# Patient Record
Sex: MALE | Race: BLACK OR AFRICAN AMERICAN | NOT HISPANIC OR LATINO | Employment: FULL TIME | URBAN - METROPOLITAN AREA
[De-identification: names, ages, dates, MRNs, and addresses within clinical notes are randomized per-mention and may not be internally consistent; named-entity substitution may affect disease eponyms.]

---

## 2021-12-02 ENCOUNTER — OCCUPATIONAL HEALTH (OUTPATIENT)
Dept: URGENT CARE | Facility: CLINIC | Age: 36
End: 2021-12-02

## 2021-12-02 DIAGNOSIS — Z02.83 ENCOUNTER FOR DRUG SCREENING: Primary | ICD-10-CM

## 2021-12-02 LAB
CTP QC/QA: YES
POC 10 PANEL DRUG SCREEN: NEGATIVE

## 2021-12-02 PROCEDURE — 80305 DRUG TEST PRSMV DIR OPT OBS: CPT | Mod: S$GLB,,, | Performed by: FAMILY MEDICINE

## 2021-12-02 PROCEDURE — 80305 POCT RAPID DRUG SCREEN 10 PANEL: ICD-10-PCS | Mod: S$GLB,,, | Performed by: FAMILY MEDICINE

## 2022-06-28 ENCOUNTER — OCCUPATIONAL HEALTH (OUTPATIENT)
Dept: URGENT CARE | Facility: CLINIC | Age: 37
End: 2022-06-28

## 2022-06-28 DIAGNOSIS — Z02.83 ENCOUNTER FOR EMPLOYMENT-RELATED DRUG TESTING: Primary | ICD-10-CM

## 2022-06-28 PROCEDURE — 80305 DRUG TEST PRSMV DIR OPT OBS: CPT | Mod: S$GLB,,, | Performed by: FAMILY MEDICINE

## 2022-06-28 PROCEDURE — 80305 OOH NON-DOT DRUG SCREEN: ICD-10-PCS | Mod: S$GLB,,, | Performed by: FAMILY MEDICINE

## 2024-04-23 ENCOUNTER — TELEPHONE (OUTPATIENT)
Dept: UROLOGY | Facility: CLINIC | Age: 39
End: 2024-04-23
Payer: COMMERCIAL

## 2024-04-23 NOTE — TELEPHONE ENCOUNTER
----- Message from Sandy Verdugo sent at 4/22/2024  6:00 PM CDT -----  Type:  Appointment Request         Name of Caller:pt  When is the first available appointment?No access  Symptoms:kidney stones  Would the patient rather a call back or a response via [facility]? call  Best Call Back Number:[phone]   Additional Information: Pt needs hospital f/u appt, and preferred Dr. York. Please call pt with further info and assistance. Thank you.

## 2024-04-30 ENCOUNTER — OFFICE VISIT (OUTPATIENT)
Dept: UROLOGY | Facility: CLINIC | Age: 39
End: 2024-04-30
Payer: COMMERCIAL

## 2024-04-30 VITALS — WEIGHT: 315 LBS | HEIGHT: 75 IN | BODY MASS INDEX: 39.17 KG/M2

## 2024-04-30 DIAGNOSIS — N20.0 KIDNEY STONES: Primary | ICD-10-CM

## 2024-04-30 LAB
BILIRUBIN, UA POC OHS: ABNORMAL
BLOOD, UA POC OHS: NEGATIVE
CLARITY, UA POC OHS: CLEAR
COLOR, UA POC OHS: YELLOW
GLUCOSE, UA POC OHS: NEGATIVE
KETONES, UA POC OHS: ABNORMAL
LEUKOCYTES, UA POC OHS: NEGATIVE
NITRITE, UA POC OHS: NEGATIVE
PH, UA POC OHS: 5.5
PROTEIN, UA POC OHS: NEGATIVE
SPECIFIC GRAVITY, UA POC OHS: 1.02
UROBILINOGEN, UA POC OHS: 0.2

## 2024-04-30 PROCEDURE — 82365 CALCULUS SPECTROSCOPY: CPT

## 2024-04-30 PROCEDURE — 99999 PR PBB SHADOW E&M-EST. PATIENT-LVL III: CPT | Mod: PBBFAC,,,

## 2024-04-30 PROCEDURE — 99203 OFFICE O/P NEW LOW 30 MIN: CPT | Mod: S$GLB,,,

## 2024-04-30 PROCEDURE — 81003 URINALYSIS AUTO W/O SCOPE: CPT | Mod: QW,S$GLB,,

## 2024-04-30 PROCEDURE — 3008F BODY MASS INDEX DOCD: CPT | Mod: CPTII,S$GLB,,

## 2024-04-30 PROCEDURE — 1160F RVW MEDS BY RX/DR IN RCRD: CPT | Mod: CPTII,S$GLB,,

## 2024-04-30 PROCEDURE — 1159F MED LIST DOCD IN RCRD: CPT | Mod: CPTII,S$GLB,,

## 2024-04-30 NOTE — PROGRESS NOTES
"[facility] Note  Staff: BRANNON Patton    PCP: None    Chief Complaint: Kidney Stone    Subjective:        HPI: Rafa Fleming is a 38 y.o. male NEW PATIENT presents today for ED follow up kidney stone. He presented to the ED on 4/18/2024 with right sided abd pain. CT ABD/Pelvis with IV contrast showed There is a 2 mm punctate calculus in the right posterior bladder right UVJ there is mild right hydroureteronephrosis. There is mild right-sided perinephric stranding. There is delayed excretion of contrast into the collecting systems on the right. There is normal excretion of contrast into the left renal collecting system and ureter.     I reviewed all imaging with him today. He states he passed the stone the same day and he brought it with him today for analysis. He does have a history of kidney stones. He denies dysuria, hematuria, fever, flank pain, incontinence, and difficulty urinating.     Questions asked the pt during ov today:  Dysuria: No  Gross Hematuria:No  Straining:No, Hesistancy:No, Intermittency:No}, Weak stream:No    Last PSA Screening: No results found for: "PSA", "PSADIAG"    History of Kidney Stones?:  Yes    Constipation issues?:  No    REVIEW OF SYSTEMS:  Review of Systems   Constitutional: Negative.  Negative for chills and fever.   HENT: Negative.     Eyes: Negative.    Respiratory: Negative.     Cardiovascular: Negative.    Gastrointestinal: Negative.  Negative for abdominal pain, constipation, diarrhea, nausea and vomiting.   Genitourinary: Negative.  Negative for dysuria, flank pain, frequency, hematuria and urgency.   Musculoskeletal: Negative.  Negative for back pain.   Skin: Negative.    Neurological: Negative.    Endo/Heme/Allergies: Negative.    Psychiatric/Behavioral: Negative.         PMHx:  Past Medical History:   Diagnosis Date    Asthma        PSHx:  Past Surgical History:   Procedure Laterality Date    BREAST SURGERY         Fam Hx:   malignancies: " No    kidney stones: Yes - father     Soc Hx:  Lives in [place]    Allergies:  Patient has no known allergies.    Medications: reviewed     Objective:   There were no vitals filed for this visit.    Physical Exam  Constitutional:       Appearance: Normal appearance.   HENT:      Head: Normocephalic.      Mouth/Throat:      Mouth: Mucous membranes are moist.   Eyes:      Conjunctiva/sclera: Conjunctivae normal.   Pulmonary:      Effort: Pulmonary effort is normal.   Abdominal:      General: There is no distension.      Palpations: Abdomen is soft.      Tenderness: There is no abdominal tenderness. There is no right CVA tenderness or left CVA tenderness.   Musculoskeletal:         General: Normal range of motion.      Cervical back: Normal range of motion.   Skin:     General: Skin is warm.   Neurological:      Mental Status: He is alert and oriented to person, place, and time.   Psychiatric:         Mood and Affect: Mood normal.         Behavior: Behavior normal.         LABS REVIEW:  UA today:  Color:Clear, Yellow  Spec. Grav.  1.025  PH  5.5  Negative for leukocytes, nitrates, protein, glucose, urobili, and blood.  Small bili  Trace ketones    Assessment:       1. Kidney stones          Plan:     Stone sent for analysis  Things you can do to decrease your risk of recurring stones:  1. Increase fluid intake - You should be producing at least 2.5 L of urine per 24 hour period. If your urine is dark you need to be drinking more water.  2. Lower sodium intake - this helps lower the amount of calcium being lost in your urine. An ideal intake is between 2300 and 3300mg of sodium daily.  3. Normal calcium diet - ideal intake is between 800 and 1200mg of calcium daily. You do not want to decrease the amount of calcium you take in because this can actually increase your risk of making stone.     Limit iced tea and billy,  avoid Tums and Rolaids, to avoid Vitamin C supplementation and to limit salt and red meat intake.       F/u as needed    [facility]: Pending    HECTOR Patton-C

## 2024-05-03 LAB
COMPN STONE: NORMAL
LABORATORY COMMENT REPORT: NORMAL
SPECIMEN SOURCE: NORMAL
STONE ANALYSIS IR-IMP: NORMAL

## 2024-05-06 ENCOUNTER — TELEPHONE (OUTPATIENT)
Dept: UROLOGY | Facility: CLINIC | Age: 39
End: 2024-05-06
Payer: COMMERCIAL

## 2024-05-06 NOTE — TELEPHONE ENCOUNTER
----- Message from Carmina Hernandez NP sent at 5/6/2024 10:49 AM CDT -----  Stone came back calcium oxalate:    Increase water intake and add lemon juice  Reduce food/drink high in calcium  Avoid calcium supplementation, including TUMS/rolaids  Reduce food/drink high in oxalate, such as tea  Avoid soft drinks  Reduce salt in diet

## 2024-10-25 ENCOUNTER — PATIENT MESSAGE (OUTPATIENT)
Dept: RESEARCH | Facility: HOSPITAL | Age: 39
End: 2024-10-25

## 2025-02-13 ENCOUNTER — OFFICE VISIT (OUTPATIENT)
Dept: URGENT CARE | Facility: CLINIC | Age: 40
End: 2025-02-13

## 2025-02-13 VITALS
SYSTOLIC BLOOD PRESSURE: 147 MMHG | RESPIRATION RATE: 16 BRPM | DIASTOLIC BLOOD PRESSURE: 95 MMHG | OXYGEN SATURATION: 98 % | HEIGHT: 75 IN | TEMPERATURE: 99 F | WEIGHT: 270 LBS | HEART RATE: 70 BPM | BODY MASS INDEX: 33.57 KG/M2

## 2025-02-13 DIAGNOSIS — S62.665A CLOSED NONDISPLACED FRACTURE OF DISTAL PHALANX OF LEFT RING FINGER, INITIAL ENCOUNTER: ICD-10-CM

## 2025-02-13 DIAGNOSIS — S67.10XA CRUSHING INJURY OF FINGER, INITIAL ENCOUNTER: Primary | ICD-10-CM

## 2025-02-13 DIAGNOSIS — Z02.6 ENCOUNTER RELATED TO WORKER'S COMPENSATION CLAIM: ICD-10-CM

## 2025-02-13 LAB
BREATH ALCOHOL: 0
CTP QC/QA: YES
POC 10 PANEL DRUG SCREEN: NEGATIVE

## 2025-02-13 RX ORDER — MELOXICAM 7.5 MG/1
7.5 TABLET ORAL DAILY
Qty: 30 TABLET | Refills: 1 | Status: SHIPPED | OUTPATIENT
Start: 2025-02-13

## 2025-02-13 NOTE — LETTER
[facility] [place] [address], [address]  [facility] [address]  Phone: [phone]  Fax: [phone]  [facility]: [id number]    Pt Name: Rafa Fleming  Injury Date: 02/12/2025   Employee ID: [id number] Date of First Treatment: 02/13/2025   Company: Networked reference to record [employer]      Appointment Time: 03:20 PM Arrived: 330   Provider: José Jonas MD Time Out:415     Office Treatment:   1. Crushing injury of finger, initial encounter    2. Encounter related to worker's compensation claim    3. Closed nondisplaced fracture of distal phalanx of left ring finger, initial encounter      Medications Ordered This Encounter   Medications    meloxicam (MOBIC) 7.5 MG tablet      Patient Instructions: Use splint as directed    Restrictions:  (work as tolerated with spint.)     Return Appointment: 2/25 at 830 or sooner if needed

## 2025-02-13 NOTE — PROGRESS NOTES
Subjective:      Patient ID: Rafa Fleming is a 39 y.o. male.    Chief Complaint: No chief complaint on file.    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  He states that someone was lowering the metal pipe, with a bobcat.  He dropped the metal pipe and it smashed the finger.   What have you taken OTC for your symptoms: n/a  What is your current pain scale out of 10? 2  There is swelling to the ring finger.     Other  This is a new problem. The current episode started yesterday. The problem occurs constantly. The problem has been unchanged. Nothing aggravates the symptoms. He has tried nothing for the symptoms. The treatment provided no relief.     ROS  Objective:     Physical Exam   4th finger: distal phalanx- bruising, swelling. TTP. Able to initiate flex/ext but tender.   Assessment:      1. Crushing injury of finger, initial encounter    2. Encounter related to worker's compensation claim    3. Closed nondisplaced fracture of distal phalanx of left ring finger, initial encounter      Plan:                 No follow-ups on file.

## 2025-02-25 ENCOUNTER — OFFICE VISIT (OUTPATIENT)
Dept: URGENT CARE | Facility: CLINIC | Age: 40
End: 2025-02-25
Payer: OTHER MISCELLANEOUS

## 2025-02-25 VITALS
DIASTOLIC BLOOD PRESSURE: 97 MMHG | BODY MASS INDEX: 33.57 KG/M2 | WEIGHT: 270 LBS | TEMPERATURE: 99 F | OXYGEN SATURATION: 97 % | SYSTOLIC BLOOD PRESSURE: 149 MMHG | HEIGHT: 75 IN | RESPIRATION RATE: 16 BRPM | HEART RATE: 64 BPM

## 2025-02-25 DIAGNOSIS — Z02.6 ENCOUNTER RELATED TO WORKER'S COMPENSATION CLAIM: Primary | ICD-10-CM

## 2025-02-25 DIAGNOSIS — S67.10XA CRUSHING INJURY OF FINGER, INITIAL ENCOUNTER: ICD-10-CM

## 2025-02-25 DIAGNOSIS — S62.665A CLOSED NONDISPLACED FRACTURE OF DISTAL PHALANX OF LEFT RING FINGER, INITIAL ENCOUNTER: ICD-10-CM

## 2025-02-25 PROCEDURE — 99213 OFFICE O/P EST LOW 20 MIN: CPT | Mod: 25,S$GLB,, | Performed by: FAMILY MEDICINE

## 2025-02-25 PROCEDURE — 26750 TREAT FINGER FRACTURE EACH: CPT | Mod: S$GLB,,, | Performed by: FAMILY MEDICINE

## 2025-02-25 NOTE — LETTER
[facility] [place] [address], [address]  [facility] [address]  Phone: [phone]  Fax: [phone]  [facility]: [phone]    Pt Name: Rafa Fleming  Injury Date:     Employee ID: [id number] Date of  Treatment: 02/25/2025   Company: [employer] reference to record [employer]      Appointment Time: 08:15 AM Arrived: 830   Provider: José Jonas MD Time Out:930     Office Treatment:   1. Encounter related to worker's compensation claim    2. Closed nondisplaced fracture of distal phalanx of left ring finger, initial encounter    3. Crushing injury of finger, initial encounter               Restrictions: Regular Duty, Discharged from Occupational Health          You have a work related injury. Medical care and treatment required as a result of a work-related injury  should be focused on restoring functional ability required to meet your daily and work activities and return to work, while striving to restore your health to pre-injury status in so far as is feasible.  If Rx a medication that can be sedating, DO NOT TAKE DURING YOUR WORK DAY.    Some OTC measures to help in recovery(if no allergies to, renal issues or pregnant):  Tylenol 325mg 3x per day  Ibuprofen 400mg 3x per day OR Aleve regular strength one tablet 2x per day  Take Pepcid 20mg BID  If applicable or discussed: Magnesium OTC daily; Topical Voltaren Gel; Lidocaine patches, neoprene OTC compression sleeve, shoe inserts  Massage area if possible  Resting of the injured area  Ice for ankle, wrist or elbow injury  Elevation of the injured area if applicable  Heating pad for muscle injury  Stretching/ROM exercises as described in clinic.   ** BE ADVISED: You should be in regular contact with your W/C  to know the status of your claim and /or (if any)pending referrals**

## 2025-02-25 NOTE — PROGRESS NOTES
Subjective:      Patient ID: Rafa Fleming is a 39 y.o. male.    Chief Complaint: Follow-up    Patient's place of employment - [employer]  Patient's job title - fabrication  Date of Injury - 2/12/2025  Body part injured - left ring finger  Current work status per last visit -(work as tolerated with spint   Improved, same, or worse - improved  Pain Scale right now (1-10) - 4- if he hits it.  0- just sitting there, or moving his fingers.   Pt states that the swelling went down.  His left ring finger only hurts when it gets hit.  Pt is not taking the meloxicam he was prescribed.             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  He states that someone was lowering the metal pipe, with a bobcat.  He dropped the metal pipe and it smashed the finger.   What have you taken OTC for your symptoms: n/a  What is your current pain scale out of 10? 2  There is swelling to the ring finger.       Follow-up  This is a new problem. The current episode started 1 to 4 weeks ago. The problem occurs constantly. The problem has been gradually improving. Nothing aggravates the symptoms. He has tried nothing for the symptoms. The treatment provided no relief.     ROS  Objective:     Physical Exam   FROM digit with mild discomfort with palpation of tip of the finger    Assessment:      1. Encounter related to worker's compensation claim    2. Closed nondisplaced fracture of distal phalanx of left ring finger, initial encounter    3. Crushing injury of finger, initial encounter      Plan:   Pt is ready to return to work.            Restrictions: Regular Duty, Discharged from Occupational Health  No follow-ups on file.

## 2025-03-17 ENCOUNTER — OCCUPATIONAL HEALTH (OUTPATIENT)
Dept: URGENT CARE | Facility: CLINIC | Age: 40
End: 2025-03-17

## 2025-03-17 PROCEDURE — 80305 DRUG TEST PRSMV DIR OPT OBS: CPT | Mod: S$GLB,,, | Performed by: EMERGENCY MEDICINE
